# Patient Record
Sex: FEMALE | Race: BLACK OR AFRICAN AMERICAN | Employment: UNEMPLOYED | URBAN - METROPOLITAN AREA
[De-identification: names, ages, dates, MRNs, and addresses within clinical notes are randomized per-mention and may not be internally consistent; named-entity substitution may affect disease eponyms.]

---

## 2024-06-03 ENCOUNTER — APPOINTMENT (OUTPATIENT)
Dept: CT IMAGING | Facility: HOSPITAL | Age: 39
End: 2024-06-03
Attending: EMERGENCY MEDICINE

## 2024-06-03 ENCOUNTER — HOSPITAL ENCOUNTER (EMERGENCY)
Facility: HOSPITAL | Age: 39
Discharge: LEFT BEFORE DISCHARGE | End: 2024-06-04
Attending: EMERGENCY MEDICINE

## 2024-06-03 DIAGNOSIS — R11.2 NAUSEA AND VOMITING, UNSPECIFIED VOMITING TYPE: ICD-10-CM

## 2024-06-03 DIAGNOSIS — R55 SYNCOPE, UNSPECIFIED SYNCOPE TYPE: Primary | ICD-10-CM

## 2024-06-03 PROCEDURE — 99285 EMERGENCY DEPT VISIT HI MDM: CPT

## 2024-06-03 PROCEDURE — 93005 ELECTROCARDIOGRAM TRACING: CPT

## 2024-06-04 ENCOUNTER — APPOINTMENT (OUTPATIENT)
Dept: CT IMAGING | Facility: HOSPITAL | Age: 39
End: 2024-06-04
Attending: EMERGENCY MEDICINE

## 2024-06-04 VITALS
HEIGHT: 70 IN | RESPIRATION RATE: 18 BRPM | HEART RATE: 73 BPM | OXYGEN SATURATION: 100 % | SYSTOLIC BLOOD PRESSURE: 113 MMHG | TEMPERATURE: 99 F | WEIGHT: 158 LBS | BODY MASS INDEX: 22.62 KG/M2 | DIASTOLIC BLOOD PRESSURE: 74 MMHG

## 2024-06-04 LAB
ALBUMIN SERPL-MCNC: 3.2 G/DL (ref 3.4–5)
ALBUMIN/GLOB SERPL: 0.8 {RATIO} (ref 1–2)
ALP LIVER SERPL-CCNC: 96 U/L
ALT SERPL-CCNC: 24 U/L
ANION GAP SERPL CALC-SCNC: 5 MMOL/L (ref 0–18)
ANTIBODY SCREEN: NEGATIVE
AST SERPL-CCNC: 22 U/L (ref 15–37)
BASOPHILS # BLD AUTO: 0.01 X10(3) UL (ref 0–0.2)
BASOPHILS NFR BLD AUTO: 0.2 %
BILIRUB SERPL-MCNC: 0.2 MG/DL (ref 0.1–2)
BUN BLD-MCNC: 3 MG/DL (ref 9–23)
CALCIUM BLD-MCNC: 8.9 MG/DL (ref 8.5–10.1)
CHLORIDE SERPL-SCNC: 107 MMOL/L (ref 98–112)
CO2 SERPL-SCNC: 26 MMOL/L (ref 21–32)
CREAT BLD-MCNC: 0.63 MG/DL
EGFRCR SERPLBLD CKD-EPI 2021: 116 ML/MIN/1.73M2 (ref 60–?)
EOSINOPHIL # BLD AUTO: 0.12 X10(3) UL (ref 0–0.7)
EOSINOPHIL NFR BLD AUTO: 3 %
ERYTHROCYTE [DISTWIDTH] IN BLOOD BY AUTOMATED COUNT: 17.3 %
GLOBULIN PLAS-MCNC: 4.1 G/DL (ref 2.8–4.4)
GLUCOSE BLD-MCNC: 94 MG/DL (ref 70–99)
HCG SERPL QL: NEGATIVE
HCT VFR BLD AUTO: 30.8 %
HGB BLD-MCNC: 9.7 G/DL
HGB RETIC QN AUTO: 20.3 PG (ref 28.2–36.6)
IMM GRANULOCYTES # BLD AUTO: 0.01 X10(3) UL (ref 0–1)
IMM GRANULOCYTES NFR BLD: 0.2 %
IMM RETICS NFR: 0.16 RATIO (ref 0.1–0.3)
LIPASE SERPL-CCNC: 11 U/L (ref 13–75)
LYMPHOCYTES # BLD AUTO: 1.33 X10(3) UL (ref 1–4)
LYMPHOCYTES NFR BLD AUTO: 33.2 %
MCH RBC QN AUTO: 26.7 PG (ref 26–34)
MCHC RBC AUTO-ENTMCNC: 31.5 G/DL (ref 31–37)
MCV RBC AUTO: 84.8 FL
MONOCYTES # BLD AUTO: 0.26 X10(3) UL (ref 0.1–1)
MONOCYTES NFR BLD AUTO: 6.5 %
NEUTROPHILS # BLD AUTO: 2.28 X10 (3) UL (ref 1.5–7.7)
NEUTROPHILS # BLD AUTO: 2.28 X10(3) UL (ref 1.5–7.7)
NEUTROPHILS NFR BLD AUTO: 56.9 %
OSMOLALITY SERPL CALC.SUM OF ELEC: 282 MOSM/KG (ref 275–295)
PLATELET # BLD AUTO: 213 10(3)UL (ref 150–450)
POTASSIUM SERPL-SCNC: 4.2 MMOL/L (ref 3.5–5.1)
PROT SERPL-MCNC: 7.3 G/DL (ref 6.4–8.2)
RBC # BLD AUTO: 3.63 X10(6)UL
RETICS # AUTO: 47.2 X10(3) UL (ref 22.5–147.5)
RETICS/RBC NFR AUTO: 1.3 %
RH BLOOD TYPE: POSITIVE
SODIUM SERPL-SCNC: 138 MMOL/L (ref 136–145)
WBC # BLD AUTO: 4 X10(3) UL (ref 4–11)

## 2024-06-04 PROCEDURE — 86901 BLOOD TYPING SEROLOGIC RH(D): CPT | Performed by: EMERGENCY MEDICINE

## 2024-06-04 PROCEDURE — 85025 COMPLETE CBC W/AUTO DIFF WBC: CPT | Performed by: EMERGENCY MEDICINE

## 2024-06-04 PROCEDURE — 80053 COMPREHEN METABOLIC PANEL: CPT | Performed by: EMERGENCY MEDICINE

## 2024-06-04 PROCEDURE — 70450 CT HEAD/BRAIN W/O DYE: CPT | Performed by: EMERGENCY MEDICINE

## 2024-06-04 PROCEDURE — 86900 BLOOD TYPING SEROLOGIC ABO: CPT | Performed by: EMERGENCY MEDICINE

## 2024-06-04 PROCEDURE — 96361 HYDRATE IV INFUSION ADD-ON: CPT

## 2024-06-04 PROCEDURE — 84703 CHORIONIC GONADOTROPIN ASSAY: CPT | Performed by: EMERGENCY MEDICINE

## 2024-06-04 PROCEDURE — 85045 AUTOMATED RETICULOCYTE COUNT: CPT | Performed by: EMERGENCY MEDICINE

## 2024-06-04 PROCEDURE — 96375 TX/PRO/DX INJ NEW DRUG ADDON: CPT

## 2024-06-04 PROCEDURE — 86850 RBC ANTIBODY SCREEN: CPT | Performed by: EMERGENCY MEDICINE

## 2024-06-04 PROCEDURE — 96374 THER/PROPH/DIAG INJ IV PUSH: CPT

## 2024-06-04 PROCEDURE — 83690 ASSAY OF LIPASE: CPT | Performed by: EMERGENCY MEDICINE

## 2024-06-04 PROCEDURE — 72125 CT NECK SPINE W/O DYE: CPT | Performed by: EMERGENCY MEDICINE

## 2024-06-04 RX ORDER — DIPHENHYDRAMINE HYDROCHLORIDE 50 MG/ML
25 INJECTION INTRAMUSCULAR; INTRAVENOUS ONCE
Status: COMPLETED | OUTPATIENT
Start: 2024-06-04 | End: 2024-06-04

## 2024-06-04 RX ORDER — HYDROMORPHONE HYDROCHLORIDE 1 MG/ML
1 INJECTION, SOLUTION INTRAMUSCULAR; INTRAVENOUS; SUBCUTANEOUS ONCE
Status: COMPLETED | OUTPATIENT
Start: 2024-06-04 | End: 2024-06-04

## 2024-06-04 RX ORDER — ONDANSETRON 2 MG/ML
4 INJECTION INTRAMUSCULAR; INTRAVENOUS ONCE
Status: COMPLETED | OUTPATIENT
Start: 2024-06-04 | End: 2024-06-04

## 2024-06-04 NOTE — ED QUICK NOTES
Pt on call light and asking to speak to a doctor. Pt informed MD is with other patients at the time. Pt began to verbally escalate and yelled \"I hit my head and yall are doing nothing about it\". This RN explained that she has declined medical care until she is seen by a provider. Pt began to yell at PCT and use verbally abusive language. This RN asked pt to speak with staff respectfully. PT stated she can \"talk however the fuck I want\". Pt notified that this behavior is unacceptable while she is in our care. Pt notified that she must speak to staff respectfully. Pt declined. [facility] plan filled out.

## 2024-06-04 NOTE — ED QUICK NOTES
Patient refusing this PCT to get updated vitals. States she's allergic to our vitals stickers. When attempting to ask what she is allergic to to accommodate, she kept repeating she's allergic. Vitals deferred at this time, RN notified.

## 2024-06-04 NOTE — ED QUICK NOTES
Pt states she \"will only do US IV because Im a hard stick\". Pt notified this RN does not do US IV but another nurse will come for this and get bloodwork. Pt states her joints hurt, thinks it is related to hx of sickle cell. This RN educated pt on keeping her hydrated through IV fluids. Pt agreeable to plan at this time. Pt refusing to allow RN to place on monitor. Pt given two warm blankets at this time.

## 2024-06-04 NOTE — ED QUICK NOTES
CT tech came to retrieve patient, patient refused and requested to speak to doctor. PCT went in to confirm she did not want CT scan, patient said \"you're just a fucking tech you're not the doctor I am not talking to you\" followed by more verbally aggressive language. Tech informed patient doctor will come in when she is available.

## 2024-06-04 NOTE — ED QUICK NOTES
Pt ambulating in room, standing in doorway shouting at staff. Pt redirected to room. Pt slammed doors. Pt walking with even and steady gait.

## 2024-06-04 NOTE — ED PROVIDER NOTES
Patient Seen in: [facility] Emergency Department      History     Chief Complaint   Patient presents with    Syncope    Fall     Stated Complaint: syncopal event today, hit her head, now having headache, R eye visual changes. *    Subjective:   HPI    Patient is a 39-year-old female presenting to the ED after having a syncopal episode today.  The history is obtained from patient who is a good historian.  The patient states that she stood up, felt lightheaded and dizzy, followed by a brief syncopal event.  She does have a history of paroxysmal atrial fibrillation on Eliquis.  She is compliant with anticoagulation.  She did strike her head.  She does have complaints of a generalized headache which is severe.  No complaints of lightheadedness at this time.  She believes that she could be dehydrated as she had multiple episodes of vomiting in the last 24 hours.  She also states that her right eye is intermittent blurred vision.  No glasses or contacts.  No additional complaints of neck pain.  No weakness or loss of sensation.  No slurring of speech or facial droop.  No change in mental status.  Patient reports that she is from [place].  She has been here for 3 days.  States that she had a blood transfusion 4 days ago.  Her hemoglobin is typically 9-10 after receiving transfusion.  Patient also states that due to her dehydration, she is experiencing sickle cell pain.  She typically receives Dilaudid, Zofran, Benadryl as well as the IV fluids.  She is experiencing typical pain in her bilateral hips, back, and knees.  No lower extremity edema.    Objective:   Past Medical History:    Arrhythmia    Atrial fibrillation (HCC)    Sickle-cell anemia (HCC)              History reviewed. No pertinent surgical history.             Social History     Socioeconomic History    Marital status: Single   Tobacco Use    Smoking status: Never    Smokeless tobacco: Never   Vaping Use    Vaping status: Never Used   Substance  and Sexual Activity    Alcohol use: Not Currently              Review of Systems    Positive for stated complaint: syncopal event today, hit her head, now having headache, R eye visual changes. *  Other systems are as noted in HPI.  Constitutional and vital signs reviewed.      All other systems reviewed and negative except as noted above.    Physical Exam     ED Triage Vitals [06/03/24 2006]   BP 98/67   Pulse 106   Resp 18   Temp 98.5 °F (36.9 °C)   Temp src Temporal   SpO2 98 %   O2 Device None (Room air)       Current Vitals:   No data recorded          Physical Exam  Vitals and nursing note reviewed.   Constitutional:       General: She is not in acute distress.     Appearance: Normal appearance. She is well-developed. She is not ill-appearing or toxic-appearing.   HENT:      Head: Normocephalic and atraumatic.      Right Ear: External ear normal.      Left Ear: External ear normal.      Mouth/Throat:      Mouth: Mucous membranes are moist.      Pharynx: Oropharynx is clear.   Eyes:      Extraocular Movements: Extraocular movements intact.      Conjunctiva/sclera: Conjunctivae normal.      Pupils: Pupils are equal, round, and reactive to light.   Neck:      Comments: Mild tenderness C2 and C5.  No deformity or swelling.  Cardiovascular:      Rate and Rhythm: Normal rate and regular rhythm.      Heart sounds: Normal heart sounds.   Pulmonary:      Effort: Pulmonary effort is normal.      Breath sounds: Normal breath sounds.   Abdominal:      General: Abdomen is flat. Bowel sounds are normal. There is no distension.      Tenderness: There is no abdominal tenderness.   Musculoskeletal:      Cervical back: Tenderness present.      Right lower leg: No edema.      Left lower leg: No edema.   Skin:     General: Skin is warm.      Capillary Refill: Capillary refill takes less than 2 seconds.      Findings: No rash.   Neurological:      General: No focal deficit present.      Mental Status: She is alert and oriented to  person, place, and time.      Sensory: No sensory deficit.      Motor: No weakness.   Psychiatric:         Mood and Affect: Mood normal.         Behavior: Behavior normal.               ED Course     Labs Reviewed   COMP METABOLIC PANEL (14) - Abnormal; Notable for the following components:       Result Value    BUN 3 (*)     Albumin 3.2 (*)     A/G Ratio 0.8 (*)     All other components within normal limits   LIPASE - Abnormal; Notable for the following components:    Lipase 11 (*)     All other components within normal limits   RETICULOCYTE COUNT - Abnormal; Notable for the following components:    Reticulocyte Hemoglobin Equivalent 20.3 (*)     All other components within normal limits   CBC W/ DIFFERENTIAL - Abnormal; Notable for the following components:    RBC 3.63 (*)     HGB 9.7 (*)     HCT 30.8 (*)     All other components within normal limits   HCG, BETA SUBUNIT, QUAL - Normal   CBC WITH DIFFERENTIAL WITH PLATELET    Narrative:     The following orders were created for panel order CBC With Differential With Platelet.  Procedure                               Abnormality         Status                     ---------                               -----------         ------                     CBC W/ DIFFERENTIAL[[id number]]          Abnormal            Final result                 Please view results for these tests on the individual orders.   TYPE AND SCREEN    Narrative:     The following orders were created for panel order Type and screen.  Procedure                               Abnormality         Status                     ---------                               -----------         ------                     ABORH (Blood Type)[[id number]]                               Final result               Antibody Screen[[id number]]                                  Final result                 Please view results for these tests on the individual orders.   ABORH (BLOOD TYPE)   ANTIBODY SCREEN   ABORH CONFIRMATION    RAINBOW DRAW BLUE                             [facility]      History obtained from patient.     Differential diagnosis includes sickle cell crisis, syncope, dehydration, electrolyte disturbance, cardiac dysrhythmia, anemia, intracranial hemorrhage, cervical spine fracture, cervical strain.    Previous records reviewed.  There is no available records available for review despite patient's significant medical history reported as A-fib, sickle cell anemia, on anticoagulation.    Testing considered and ordered includes EKG, CT brain and cervical spine, CBC, CMP, reticulocyte count, hCG, lipase.  The patient does not have any abdominal tenderness would warrant CT of the abdomen pelvis at this time.  No active nausea or vomiting at this time.    I reviewed all results.  CBC is unremarkable other than hemoglobin of 9.7.  The patient states her baseline is typically 9-10 and that she was transfused approximately 4 days ago.  CMP unremarkable.  Total bilirubin is normal.  Reticulocyte count is normal.    I also reviewed the official report which shows         CT HEAD WITHOUT CONTRAST    CT CERVICAL SPINE WITHOUT CONTRAST    Comparison: None      IMPRESSION:    CT head:    No acute intracranial abnormality.    No evidence of intracranial mass or hemorrhage or mass-effect.    No calvarial abnormality.    Visualized portions of the paranasal sinuses are unremarkable.        CT cervical spine:    No acute osseous abnormality.  No evidence of fracture, traumatic malalignment or prevertebral soft tissue swelling.    Disc spaces and spinal canal are well-maintained.    Cervical straightening.          Interventions in ED doing Dilaudid along with Zofran and Benadryl per patient request as she states that this is typically what she receives and sickle cell crisis.  She also received IV fluids..  She also received IV fluids for possible dehydration.  It was difficult to establish IV access.  This was obtained by nursing staff with use of  ultrasound.  Unfortunately, the patient's line was lost in the ED after meds and partial bolus. No vomiting.  Can try PO while her results were pending.      Patient reports an extensive medical history, but there are no previous medical records available for review in epic.  She was also unable to provide any information to registration regarding Social Security number, current address, telephone number, or identification.  Patient reported that her phone had  however, she was in the room sitting on the bench with her phone charging on her phone when I entered the room.  Patient has also been intermittently verbally abusive towards staff in the ED.  She is awake, alert, and oriented x 3.  Ambulatory with a steady gait.    Additional pain medications were held until CT results could be reviewed.  The patient ultimately left AGAINST MEDICAL ADVICE assuming all risks including declining condition, permanent disability death. May follow-up with her primary care provider or hematologist as well as she states that she is going to be returning home.  May return to ED if any symptoms worsen, persist, or new symptoms develop.  Vital signs are stable.                         Medical Decision Making      Disposition and Plan     Clinical Impression:  1. Syncope, unspecified syncope type    2. Nausea and vomiting, unspecified vomiting type         Disposition:  [place]  2024  2:34 am    Follow-up:  Beba Mims MD  [address] [place] [id number] [phone]    Schedule an appointment as soon as possible for a visit in 2 day(s)      [facility] Emergency Department  [address] [place] [id number] [phone]  Follow up  IF SYMPTOMS WORSEN, PERSIST, OR NEW SYMPTOMS DEVEL          Medications Prescribed:  There are no discharge medications for this patient.